# Patient Record
Sex: FEMALE | Race: WHITE | URBAN - METROPOLITAN AREA
[De-identification: names, ages, dates, MRNs, and addresses within clinical notes are randomized per-mention and may not be internally consistent; named-entity substitution may affect disease eponyms.]

---

## 2022-06-30 ENCOUNTER — HOSPITAL ENCOUNTER (INPATIENT)
Age: 37
LOS: 3 days | Discharge: HOME | DRG: 751 | End: 2022-07-03
Attending: EMERGENCY MEDICINE | Admitting: PSYCHIATRY & NEUROLOGY
Payer: COMMERCIAL

## 2022-06-30 ENCOUNTER — APPOINTMENT (OUTPATIENT)
Dept: CT IMAGING | Age: 37
DRG: 751 | End: 2022-06-30
Payer: COMMERCIAL

## 2022-06-30 DIAGNOSIS — F32.9 MAJOR DEPRESSIVE DISORDER WITH CURRENT ACTIVE EPISODE, UNSPECIFIED DEPRESSION EPISODE SEVERITY, UNSPECIFIED WHETHER RECURRENT: ICD-10-CM

## 2022-06-30 DIAGNOSIS — Z86.59 H/O: DEPRESSION: Primary | ICD-10-CM

## 2022-06-30 PROBLEM — F33.9 MAJOR DEPRESSION, RECURRENT (HCC): Status: ACTIVE | Noted: 2022-06-30

## 2022-06-30 LAB
ACETAMINOPHEN LEVEL: <5 UG/ML (ref 10–30)
ALBUMIN SERPL-MCNC: 4 G/DL (ref 3.5–4.6)
ALP BLD-CCNC: 69 U/L (ref 40–130)
ALT SERPL-CCNC: 11 U/L (ref 0–33)
AMPHETAMINE SCREEN, URINE: POSITIVE
ANION GAP SERPL CALCULATED.3IONS-SCNC: 10 MEQ/L (ref 9–15)
APTT: 29.2 SEC (ref 24.4–36.8)
AST SERPL-CCNC: 12 U/L (ref 0–35)
BARBITURATE SCREEN URINE: ABNORMAL
BASOPHILS ABSOLUTE: 0.1 K/UL (ref 0–0.2)
BASOPHILS RELATIVE PERCENT: 0.9 %
BENZODIAZEPINE SCREEN, URINE: ABNORMAL
BILIRUB SERPL-MCNC: <0.2 MG/DL (ref 0.2–0.7)
BILIRUBIN URINE: NEGATIVE
BLOOD, URINE: NEGATIVE
BUN BLDV-MCNC: 13 MG/DL (ref 6–20)
CALCIUM SERPL-MCNC: 9.3 MG/DL (ref 8.5–9.9)
CANNABINOID SCREEN URINE: POSITIVE
CHLORIDE BLD-SCNC: 101 MEQ/L (ref 95–107)
CHOLESTEROL, TOTAL: 184 MG/DL (ref 0–199)
CLARITY: ABNORMAL
CO2: 27 MEQ/L (ref 20–31)
COCAINE METABOLITE SCREEN URINE: ABNORMAL
COLOR: YELLOW
CREAT SERPL-MCNC: 0.7 MG/DL (ref 0.5–0.9)
EOSINOPHILS ABSOLUTE: 0.1 K/UL (ref 0–0.7)
EOSINOPHILS RELATIVE PERCENT: 0.9 %
ETHANOL PERCENT: NORMAL G/DL
ETHANOL: <10 MG/DL (ref 0–0.08)
FENTANYL SCREEN, URINE: ABNORMAL
GFR AFRICAN AMERICAN: >60
GFR NON-AFRICAN AMERICAN: >60
GLOBULIN: 2.6 G/DL (ref 2.3–3.5)
GLUCOSE BLD-MCNC: 95 MG/DL (ref 70–99)
GLUCOSE URINE: NEGATIVE MG/DL
HCG(URINE) PREGNANCY TEST: NEGATIVE
HCG, URINE, POC: NEGATIVE
HCT VFR BLD CALC: 28.8 % (ref 37–47)
HDLC SERPL-MCNC: 70 MG/DL (ref 40–59)
HEMOGLOBIN: 9.2 G/DL (ref 12–16)
INR BLD: 1.2
KETONES, URINE: ABNORMAL MG/DL
LDL CHOLESTEROL CALCULATED: 92 MG/DL (ref 0–129)
LEUKOCYTE ESTERASE, URINE: NEGATIVE
LYMPHOCYTES ABSOLUTE: 1.4 K/UL (ref 1–4.8)
LYMPHOCYTES RELATIVE PERCENT: 18.2 %
Lab: ABNORMAL
Lab: NORMAL
MAGNESIUM: 2 MG/DL (ref 1.7–2.4)
MCH RBC QN AUTO: 25.5 PG (ref 27–31.3)
MCHC RBC AUTO-ENTMCNC: 31.9 % (ref 33–37)
MCV RBC AUTO: 79.7 FL (ref 82–100)
METHADONE SCREEN, URINE: ABNORMAL
MONOCYTES ABSOLUTE: 0.7 K/UL (ref 0.2–0.8)
MONOCYTES RELATIVE PERCENT: 8.4 %
NEGATIVE QC PASS/FAIL: NORMAL
NEUTROPHILS ABSOLUTE: 5.7 K/UL (ref 1.4–6.5)
NEUTROPHILS RELATIVE PERCENT: 71.6 %
NITRITE, URINE: NEGATIVE
OPIATE SCREEN URINE: ABNORMAL
OXYCODONE URINE: ABNORMAL
PDW BLD-RTO: 16.5 % (ref 11.5–14.5)
PH UA: 7 (ref 5–9)
PHENCYCLIDINE SCREEN URINE: ABNORMAL
PLATELET # BLD: 422 K/UL (ref 130–400)
POSITIVE QC PASS/FAIL: NORMAL
POTASSIUM REFLEX MAGNESIUM: 3.1 MEQ/L (ref 3.4–4.9)
PROPOXYPHENE SCREEN: ABNORMAL
PROTEIN UA: NEGATIVE MG/DL
PROTHROMBIN TIME: 14.6 SEC (ref 12.3–14.9)
RBC # BLD: 3.61 M/UL (ref 4.2–5.4)
SALICYLATE, SERUM: <0.3 MG/DL (ref 15–30)
SARS-COV-2, NAAT: NOT DETECTED
SODIUM BLD-SCNC: 138 MEQ/L (ref 135–144)
SPECIFIC GRAVITY UA: 1.02 (ref 1–1.03)
TOTAL CK: 88 U/L (ref 0–170)
TOTAL PROTEIN: 6.6 G/DL (ref 6.3–8)
TRIGL SERPL-MCNC: 110 MG/DL (ref 0–150)
TSH REFLEX: 0.46 UIU/ML (ref 0.44–3.86)
URINE REFLEX TO CULTURE: ABNORMAL
UROBILINOGEN, URINE: 0.2 E.U./DL
WBC # BLD: 7.9 K/UL (ref 4.8–10.8)

## 2022-06-30 PROCEDURE — 84443 ASSAY THYROID STIM HORMONE: CPT

## 2022-06-30 PROCEDURE — 80143 DRUG ASSAY ACETAMINOPHEN: CPT

## 2022-06-30 PROCEDURE — 85610 PROTHROMBIN TIME: CPT

## 2022-06-30 PROCEDURE — 81003 URINALYSIS AUTO W/O SCOPE: CPT

## 2022-06-30 PROCEDURE — 80053 COMPREHEN METABOLIC PANEL: CPT

## 2022-06-30 PROCEDURE — 70450 CT HEAD/BRAIN W/O DYE: CPT

## 2022-06-30 PROCEDURE — 99284 EMERGENCY DEPT VISIT MOD MDM: CPT

## 2022-06-30 PROCEDURE — 83735 ASSAY OF MAGNESIUM: CPT

## 2022-06-30 PROCEDURE — 6370000000 HC RX 637 (ALT 250 FOR IP): Performed by: EMERGENCY MEDICINE

## 2022-06-30 PROCEDURE — 80179 DRUG ASSAY SALICYLATE: CPT

## 2022-06-30 PROCEDURE — 82077 ASSAY SPEC XCP UR&BREATH IA: CPT

## 2022-06-30 PROCEDURE — 80307 DRUG TEST PRSMV CHEM ANLYZR: CPT

## 2022-06-30 PROCEDURE — 80061 LIPID PANEL: CPT

## 2022-06-30 PROCEDURE — 82550 ASSAY OF CK (CPK): CPT

## 2022-06-30 PROCEDURE — 85730 THROMBOPLASTIN TIME PARTIAL: CPT

## 2022-06-30 PROCEDURE — 36415 COLL VENOUS BLD VENIPUNCTURE: CPT

## 2022-06-30 PROCEDURE — 1240000000 HC EMOTIONAL WELLNESS R&B

## 2022-06-30 PROCEDURE — 84703 CHORIONIC GONADOTROPIN ASSAY: CPT

## 2022-06-30 PROCEDURE — 93005 ELECTROCARDIOGRAM TRACING: CPT | Performed by: EMERGENCY MEDICINE

## 2022-06-30 PROCEDURE — 87635 SARS-COV-2 COVID-19 AMP PRB: CPT

## 2022-06-30 PROCEDURE — 6370000000 HC RX 637 (ALT 250 FOR IP): Performed by: NURSE PRACTITIONER

## 2022-06-30 PROCEDURE — 85025 COMPLETE CBC W/AUTO DIFF WBC: CPT

## 2022-06-30 RX ORDER — FLUOXETINE HYDROCHLORIDE 20 MG/1
20 CAPSULE ORAL DAILY
Status: ON HOLD | COMMUNITY
End: 2022-07-03 | Stop reason: HOSPADM

## 2022-06-30 RX ORDER — HALOPERIDOL 5 MG
5 TABLET ORAL EVERY 6 HOURS PRN
Status: DISCONTINUED | OUTPATIENT
Start: 2022-06-30 | End: 2022-07-03 | Stop reason: HOSPADM

## 2022-06-30 RX ORDER — POTASSIUM CHLORIDE 20 MEQ/1
40 TABLET, EXTENDED RELEASE ORAL ONCE
Status: COMPLETED | OUTPATIENT
Start: 2022-06-30 | End: 2022-06-30

## 2022-06-30 RX ORDER — ACETAMINOPHEN 325 MG/1
650 TABLET ORAL EVERY 4 HOURS PRN
Status: DISCONTINUED | OUTPATIENT
Start: 2022-06-30 | End: 2022-07-03 | Stop reason: HOSPADM

## 2022-06-30 RX ORDER — DEXTROAMPHETAMINE SACCHARATE, AMPHETAMINE ASPARTATE, DEXTROAMPHETAMINE SULFATE AND AMPHETAMINE SULFATE 7.5; 7.5; 7.5; 7.5 MG/1; MG/1; MG/1; MG/1
30 TABLET ORAL DAILY
Status: ON HOLD | COMMUNITY
End: 2022-07-03 | Stop reason: HOSPADM

## 2022-06-30 RX ORDER — HALOPERIDOL 5 MG/ML
5 INJECTION INTRAMUSCULAR EVERY 6 HOURS PRN
Status: DISCONTINUED | OUTPATIENT
Start: 2022-06-30 | End: 2022-07-03 | Stop reason: HOSPADM

## 2022-06-30 RX ORDER — BENZTROPINE MESYLATE 1 MG/ML
2 INJECTION INTRAMUSCULAR; INTRAVENOUS 2 TIMES DAILY PRN
Status: DISCONTINUED | OUTPATIENT
Start: 2022-06-30 | End: 2022-07-03 | Stop reason: HOSPADM

## 2022-06-30 RX ORDER — MAGNESIUM HYDROXIDE/ALUMINUM HYDROXICE/SIMETHICONE 120; 1200; 1200 MG/30ML; MG/30ML; MG/30ML
30 SUSPENSION ORAL PRN
Status: DISCONTINUED | OUTPATIENT
Start: 2022-06-30 | End: 2022-07-03 | Stop reason: HOSPADM

## 2022-06-30 RX ORDER — SERTRALINE HYDROCHLORIDE 100 MG/1
100 TABLET, FILM COATED ORAL DAILY
Status: ON HOLD | COMMUNITY
End: 2022-07-03 | Stop reason: HOSPADM

## 2022-06-30 RX ORDER — GABAPENTIN 300 MG/1
300 CAPSULE ORAL 2 TIMES DAILY
COMMUNITY

## 2022-06-30 RX ORDER — TRAZODONE HYDROCHLORIDE 50 MG/1
50 TABLET ORAL NIGHTLY PRN
Status: DISCONTINUED | OUTPATIENT
Start: 2022-06-30 | End: 2022-07-03 | Stop reason: HOSPADM

## 2022-06-30 RX ORDER — GABAPENTIN 300 MG/1
300 CAPSULE ORAL 2 TIMES DAILY
Status: DISCONTINUED | OUTPATIENT
Start: 2022-06-30 | End: 2022-07-02

## 2022-06-30 RX ORDER — HYDROXYZINE HYDROCHLORIDE 50 MG/ML
50 INJECTION, SOLUTION INTRAMUSCULAR EVERY 6 HOURS PRN
Status: DISCONTINUED | OUTPATIENT
Start: 2022-06-30 | End: 2022-07-03 | Stop reason: HOSPADM

## 2022-06-30 RX ORDER — HYDROXYZINE PAMOATE 50 MG/1
50 CAPSULE ORAL EVERY 6 HOURS PRN
Status: DISCONTINUED | OUTPATIENT
Start: 2022-06-30 | End: 2022-07-03 | Stop reason: HOSPADM

## 2022-06-30 RX ADMIN — GABAPENTIN 300 MG: 300 CAPSULE ORAL at 21:35

## 2022-06-30 RX ADMIN — POTASSIUM CHLORIDE 40 MEQ: 1500 TABLET, EXTENDED RELEASE ORAL at 03:51

## 2022-06-30 ASSESSMENT — SLEEP AND FATIGUE QUESTIONNAIRES
SLEEP PATTERN: NORMAL
AVERAGE NUMBER OF SLEEP HOURS: 6
DO YOU USE A SLEEP AID: NO
DO YOU HAVE DIFFICULTY SLEEPING: NO

## 2022-06-30 ASSESSMENT — PATIENT HEALTH QUESTIONNAIRE - PHQ9
SUM OF ALL RESPONSES TO PHQ QUESTIONS 1-9: 2
SUM OF ALL RESPONSES TO PHQ QUESTIONS 1-9: 12

## 2022-06-30 ASSESSMENT — PAIN - FUNCTIONAL ASSESSMENT: PAIN_FUNCTIONAL_ASSESSMENT: NONE - DENIES PAIN

## 2022-06-30 ASSESSMENT — LIFESTYLE VARIABLES
HOW MANY STANDARD DRINKS CONTAINING ALCOHOL DO YOU HAVE ON A TYPICAL DAY: 1 OR 2
HOW OFTEN DO YOU HAVE A DRINK CONTAINING ALCOHOL: MONTHLY OR LESS

## 2022-06-30 NOTE — ED TRIAGE NOTES
Per family, pt has not been taking her medications but has not been taking them at th right time. Per family she has been taking them at the same time is suppose to take them 12 hours apart. Per family they talked to her provider and she has been taking them as she is suppose to for 2 days and is still having problems. Per family pt is having sensitivity to touch, agreeing to everything, and been having memory loss.

## 2022-06-30 NOTE — ED NOTES
Call placed to [facility] and spoke with Dre Galvez to update in insurance verification and patient will not need to be assessed.       Kevin Ching RN  06/30/22 4071

## 2022-06-30 NOTE — ED NOTES
Pt is in bed area quiet and cooperative with even respirations no problems and no C/O any kind expressed.      Yuridia Soria RN  06/30/22 0782 MED
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
## 2022-06-30 NOTE — ED NOTES
Pt ate fair for breakfast drinking fluids no problems and no C/O any kind expressed.      Torsten Leung RN  06/30/22 4412
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
## 2022-06-30 NOTE — ED NOTES
Breakfast was given to the pt at the bedside, pt is quiet and cooperative with even respirations no problems and no C/O any kind expressed     Torsten Leung RN  06/30/22 2890

## 2022-06-30 NOTE — ED NOTES
Call placed to [facility] and home medications verified.       Danielle Paniagua RN  06/30/22 6682

## 2022-06-30 NOTE — PROGRESS NOTES
Patient has been very cooperative on the floor. She has been talking with  and then visiting with him in evening. She said she is already feeling better. Patient said she just misunderstood the directions for her medication. Also says her memory is coming back. Ate complete dinner.

## 2022-06-30 NOTE — ED NOTES
Spoke with  after he was able to provide the patient's HIPPA code.  states she has [employer] and it may be under his last name of Patricia Zarate. Call placed to registration and [employer] insurance was added to the chart.  states he has called her PCP and demanding the provider call the ED to discuss plan of treatment. Explained process of pink slip and denies need for patient to be assessed by a psychiatrist.  has called the ED repeatedly and provided the same information.        Sapna Choudhury RN  06/30/22 3065

## 2022-06-30 NOTE — ED NOTES
Message left for Dr. Joshua Beltre to return call for disposition.      Linda Martínez RN  06/30/22 1010

## 2022-06-30 NOTE — ED NOTES
Kaylah Fuller contacted since patient does not have any insurance on file.       Jolynn Helton RN  06/30/22 3289
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
## 2022-06-30 NOTE — ED NOTES
Pt is quiet and cooperative with no problems and no C/O any kind expressed.       Juan Carlos Villalobos, SAMSON  06/30/22 6441

## 2022-06-30 NOTE — ED NOTES
Pt is in bed area quiet and cooperative with no problems and no C/O any kind expressed.   Pt has even respirations noted     Lesley Davis RN  06/30/22 5613

## 2022-06-30 NOTE — ED NOTES
Call placed to lab and spoke with Conrad Angeles to notify of add on urine pregnancy.       Deloris Gordillo RN  06/30/22 0516

## 2022-06-30 NOTE — ED NOTES
Report given to [facility] on 3W. Patient to be admitted to room 374 after staffing is able to provide a 1:1.       Ericka Christian RN  06/30/22 7637

## 2022-06-30 NOTE — ED NOTES
Patient updated on plan of care. Patient visibly upset saying she wants to go home that she called off of work for today so she would be safe at home. When explaining that this has been going on for more than just a day and her safety is a concern as well as her judgement, she yelled that she wants to go home. She was demanding to talk to the doctor, so that she can go home. Phone call placed to doctor Thomas Gupta. Dr. Thomas Gupta on the unit to talk with her at this time.        Jolynn Helton RN  06/30/22 2027

## 2022-06-30 NOTE — ED NOTES
Received a return call from Dr. Chapis Russo. Discussed events leading to admit, current assessment, labs, CT scan and home medications. Received order to admit to 3W with 1:1 at this time for safety due to patient's behavior.       Lina Burton RN  06/30/22 6993

## 2022-06-30 NOTE — ED NOTES
Christal from [facility] called she is going to come to the [facility] to assess the pt.   Advised asleep but is arousal for an assessment  Pt has no insurance looking for a possible indigent bed     Cassidy Lakhani RN  06/30/22 3255

## 2022-06-30 NOTE — ED NOTES
Ander Ramirez notified of patient needing [facility] referral      [facility]  06/30/22 1817

## 2022-07-01 PROCEDURE — 6370000000 HC RX 637 (ALT 250 FOR IP): Performed by: NURSE PRACTITIONER

## 2022-07-01 PROCEDURE — 6370000000 HC RX 637 (ALT 250 FOR IP): Performed by: PSYCHIATRY & NEUROLOGY

## 2022-07-01 PROCEDURE — 1240000000 HC EMOTIONAL WELLNESS R&B

## 2022-07-01 PROCEDURE — 99223 1ST HOSP IP/OBS HIGH 75: CPT | Performed by: PSYCHIATRY & NEUROLOGY

## 2022-07-01 RX ORDER — DIVALPROEX SODIUM 500 MG/1
500 TABLET, EXTENDED RELEASE ORAL DAILY
Status: DISCONTINUED | OUTPATIENT
Start: 2022-07-01 | End: 2022-07-03 | Stop reason: HOSPADM

## 2022-07-01 RX ADMIN — GABAPENTIN 300 MG: 300 CAPSULE ORAL at 21:57

## 2022-07-01 RX ADMIN — DIVALPROEX SODIUM 500 MG: 500 TABLET, EXTENDED RELEASE ORAL at 10:15

## 2022-07-01 NOTE — CONSULTS
[address] MEDICINE    HISTORY AND PHYSICAL EXAM    PATIENT NAME:  Hira Mccarthy    MRN:  [id number]  SERVICE DATE:  7/1/2022   SERVICE TIME:  5:09 PM    Primary Care Physician: No primary care provider on file. SUBJECTIVE  CHIEF COMPLAINT:  Medically appropriate for inpatient psychiatry admission. Consult for medical H/P encounter. HPI:  This is a 44 y.o. female with PMHx of depression and anxiety who presented to emergency room with  for psychiatric evaluation.  is concerned for patient's safety since starting Prozac and Zoloft. He stated to emergency room staff that she is very forgetful and very easy to persuade. Patient is hemodynamically stable and afebrile. She does present with iron deficiency anemia. Potassium was 3.1, UDS positive for amphetamines and THC. Patient medically cleared from emergency room for psychiatric care. Patient Seen, Chart, Labs, Radiologystudies, and Consults reviewed. Patient denies headache, chest pain, shortness of breath, N/V/D/C, fever/chills. PAST MEDICAL HISTORY:    Past Medical History:   Diagnosis Date    Anxiety     Depression      PAST SURGICAL HISTORY:    Past Surgical History:   Procedure Laterality Date    APPENDECTOMY       FAMILY HISTORY:  No family history on file.   SOCIAL HISTORY:    Social History     Socioeconomic History    Marital status: Single     Spouse name: Not on file    Number of children: Not on file    Years of education: Not on file    Highest education level: Not on file   Occupational History    Not on file   Tobacco Use    Smoking status: Not on file    Smokeless tobacco: Not on file   Substance and Sexual Activity    Alcohol use: Not on file    Drug use: Not on file    Sexual activity: Not on file   Other Topics Concern    Not on file   Social History Narrative    Not on file     Social Determinants of Health     Financial Resource Strain:     Difficulty of Paying Living Expenses: Not on file   Food Insecurity:     Worried About [facility] in the Last Year: Not on file    Drew of Food in the Last Year: Not on file   Transportation Needs:     Lack of Transportation (Medical): Not on file    Lack of Transportation (Non-Medical):  Not on file   Physical Activity:     Days of Exercise per Week: Not on file    Minutes of Exercise per Session: Not on file   Stress:     Feeling of Stress : Not on file   Social Connections:     Frequency of Communication with Friends and Family: Not on file    Frequency of Social Gatherings with Friends and Family: Not on file    Attends Protestant Services: Not on file    Active Member of Clubs or Organizations: Not on file    Attends Club or Organization Meetings: Not on file    Marital Status: Not on file   Intimate Partner Violence:     Fear of Current or Ex-Partner: Not on file    Emotionally Abused: Not on file    Physically Abused: Not on file    Sexually Abused: Not on file   Housing Stability:     Unable to Pay for Housing in the Last Year: Not on file    Number of [place] in the Last Year: Not on file    Unstable Housing in the Last Year: Not on file     MEDICATIONS:    Current Facility-Administered Medications   Medication Dose Route Frequency Provider Last Rate Last Admin    divalproex (DEPAKOTE ER) extended release tablet 500 mg  500 mg Oral Daily Brenden Sharma MD   500 mg at 07/01/22 1015    acetaminophen (TYLENOL) tablet 650 mg  650 mg Oral Q4H PRN Brenden Sharma MD        magnesium hydroxide (MILK OF MAGNESIA) 400 MG/5ML suspension 30 mL  30 mL Oral Daily PRN Brenden Sharma MD        aluminum & magnesium hydroxide-simethicone (MAALOX) 200-200-20 MG/5ML suspension 30 mL  30 mL Oral PRN Brenden Sharma MD        haloperidol (HALDOL) tablet 5 mg  5 mg Oral Q6H PRN Brenden Sharma MD        Or    haloperidol lactate (HALDOL) injection 5 mg  5 mg IntraMUSCular Q6H PRN MD Wesly Cuellar benztropine mesylate (COGENTIN) injection 2 mg  2 mg IntraMUSCular BID PRN Brenden Sharma MD        traZODone (DESYREL) tablet 50 mg  50 mg Oral Nightly PRN Brenden Sharma MD        hydrOXYzine (VISTARIL) injection 50 mg  50 mg IntraMUSCular Q6H PRN Brenden Sharma MD        Or    hydrOXYzine pamoate (VISTARIL) capsule 50 mg  50 mg Oral Q6H PRN Brenden Sharma MD        gabapentin (NEURONTIN) capsule 300 mg  300 mg Oral BID Nicoletto Bolzan-Roche, APRN - CNP   300 mg at 06/30/22 2135       ALLERGIES: Patient has no known allergies. REVIEW OF SYSTEM:   ROS as noted in HPI, 12 point ROS reviewed and otherwise negative. OBJECTIVE  PHYSICAL EXAM: /75   Pulse 94   Temp 98.1 °F (36.7 °C)   Resp 18   Ht 5' (1.524 m)   Wt 145 lb (65.8 kg)   LMP 06/16/2022 (Approximate)   SpO2 100%   BMI 28.32 kg/m²   CONSTITUTIONAL:  awake, alert, cooperative, no apparent distress, and appears stated age  EYES:  Lids and lashes normal, pupils equal, round and reactive to light, extra ocular muscles intact, sclera clear, conjunctiva normal  ENT:  Normocephalic, without obvious abnormality, atraumatic, sinuses nontender on palpation, external ears without lesions, oral pharynx with moist mucus membranes, tonsils without erythema or exudates, gums normal and good dentition. NECK:  Supple, symmetrical, trachea midline, no adenopathy, thyroid symmetric, not enlarged and no tenderness, skin normal  LUNGS:  No increased work of breathing, good air exchange, clear to auscultation bilaterally, no crackles or wheezing  CARDIOVASCULAR:  Normal apical impulse, regular rate and rhythm, normal S1 and S2, no S3 or S4, and no murmur noted  ABDOMEN:  No scars, normal bowel sounds, soft, non-distended, non-tender, no masses palpated, no hepatosplenomegally  MUSCULOSKELETAL:  There is no redness, warmth, or swelling of the joints. Full range of motion noted. Motor strength is 5 out of 5 all extremities bilaterally.   Tone is normal.  NEUROLOGIC:  Awake, alert, oriented to name, place and time. Cranial nerves II-XII are grossly intact. Motor is 5 out of 5 bilaterally. Sensory is intact.  gait is normal.  SKIN:  no bruising or bleeding, normal skin color, texture, turgor, no redness, warmth, or swelling and no jaundice    DATA:     Diagnostic tests reviewed for today's visit:    Most recent labs and imaging results reviewed. VTE Prophylaxis:     ASSESSMENT AND PLAN  Principal Problem:    Major depression, recurrent ([facility] [address]  Plan: Patient admitted to behavorial health for evaluation and treatment     Mild hypokalemia: Does not appear the patient was given any potassium replacement. Will recheck BMP in a.m. Microcytic anemia, likely due to iron deficiency or anemia of chronic disease: Checking Iron panel and TIBC    This is only a history and physical examination and not medical management. The patient is to contact and follow up with their primary care physician and go over any abnormal labs, imaging, findings, medical concerns, or conditions that we have and have not addressed during this encounter.     Plan of care discussed with: patient    SIGNATURE: CHAPARRO Mercer CNP  DATE: July 1, 2022  TIME: 5:09 PM

## 2022-07-01 NOTE — PROGRESS NOTES
Pt reports takes neurontin at home for RLS. Per home med list neurontin 300mg tid. PS Leydi Shearer NP, verified with oarrs report scheduled twice daily. NP did reorder neurontin 300mg bid.

## 2022-07-01 NOTE — H&P
[address] [place] [address] Department of Psychiatry    History and Physical - Adult         CHIEF COMPLAINT:  Hypomania    History obtained from:  patient    Patient was seen after discussing with the treatment team and reviewing the chart        CIRCUMSTANCES OF ADMISSION:     Patient was brought in by her  and daughter after he noticed a change in her. Per : the wife is doing things that she has never done before. For example, on the way into the hospital he asked her to give him \"a blow job\", and she was going to do it. He did not allow it to happen and talked to her about the fact that she was willing to do this while their 16year old daughter was in the back seat. He says this behavior is not characteristic of her. She has had recent medication changes within the last 2 weeks. She also has stress at her job where she is training for a promotion which she does admit that is increasing her stress. While talking she mentioned that her sleeping has been off because she doesn't want to sleep, siting that she wants to be able to see her  instead of sleeping. Later in the conversation when asked about how many hours she was sleeping a night, she said that she gets in between 6-10 hours each night. At times she seems confused while talking and her eyes will dart back in forth like she is searching for an answer. She denies si, hi and avh. Maintains appropriate eye contact. Hygiene is adequate, but there is concern for her safety as she admits that she just does whatever she is told to do without regard to consequences, morals, values or ethics. HISTORY OF PRESENT ILLNESS:      The patient is a 44 y.o. female live with her  and daughter 17 y/o with significant past history of MDD and ADD     brought her to hospital because he was concerned about the patient  Stressors:Recent medication changes: added Prozac and an increase to her Adderral (two times).  Stressed at work, with full time  Relationships: ,  for 2 months and reconciled again a month ago  Children: 1  Current Support: romantic partner    Legal Hx: none  Access to weapons?:  No      EXAMINATION:    REVIEW OF SYSTEMS:    ROS:  [x] All negative/unchanged except if checked.  Explain positive(checked items) below:  [] Constitutional  [] Eyes  [] Ear/Nose/Mouth/Throat  [] Respiratory  [] CV  [] GI  []   [] Musculoskeletal  [] Skin/Breast  [] Neurological  [] Endocrine  [] Heme/Lymph  [] Allergic/Immunologic    Explanation:     Vitals:  /75   Pulse 94   Temp 98.1 °F (36.7 °C)   Resp 18   Ht 5' (1.524 m)   Wt 145 lb (65.8 kg)   LMP 06/16/2022 (Approximate)   SpO2 100%   BMI 28.32 kg/m²      Neurologic Exam:   Muscle Strength & Tone: full ROM  Gait: normal gait   Involuntary Movements: No    Mental Status Examination:    Level of consciousness:  within normal limits   Appearance:  street clothes  Behavior/Motor:  no abnormalities noted  Attitude toward examiner:  cooperative  Speech:  rapid   Mood: labile  Affect:  mood incongruent  Thought processes:  rapid   Association:  Thought content:  Suicidal Ideation:  denies suicidal ideation  Delusions:  no evidence of delusions  Perceptual Disturbance:  denies any perceptual disturbance  Cognition:  oriented to person, place, and time   Attention & Concentration poor  Memory intact  Insight poor   Judgement poor  Fund of Knowledge limited    Mini Mental Status 30/30      DIAGNOSIS:     Bipolar hypomanic episode           RISK ASSESSMENT:    SUICIDE RISK ASSESSMENT: high risk of impulsivity  HOMICIDE: low  AGITATION/VIOLENCE: low  ELOPEMENT: low    LABS: REVIEWED TODAY:  Recent Labs     06/30/22  0200   WBC 7.9   HGB 9.2*   *     Recent Labs     06/30/22  0200      K 3.1*      CO2 27   BUN 13   CREATININE 0.70   GLUCOSE 95     Recent Labs     06/30/22  0200   BILITOT <0.2   ALKPHOS 69   AST 12   ALT 11     Lab Results   Component Value Date/Time    LABAMPH POSITIVE 06/30/2022 02:00 AM    BARBSCNU Neg 06/30/2022 02:00 AM    LABBENZ Neg 06/30/2022 02:00 AM    LABMETH Neg 06/30/2022 02:00 AM    OPIATESCREENURINE Neg 06/30/2022 02:00 AM    PHENCYCLIDINESCREENURINE Neg 06/30/2022 02:00 AM    ETOH <10 06/30/2022 02:00 AM     No results found for: TSH, FREET4  No results found for: LITHIUM  No results found for: VALPROATE, CBMZ  No results found for: LITHIUM, VALPROATE    FURTHER LABS ORDERED :      Radiology   CT HEAD WO CONTRAST    Result Date: 6/30/2022  CT HEAD WO CONTRAST CLINICAL HISTORY:   AMS, facial droop COMPARISON:  NONE AVAILABLE TECHNIQUE: Multiple unenhanced serial axial images of the brain from the vertex of the skull to the base of the skull were performed. FINDINGS:  The ventricles are of normal size and configuration. No mass or midline shift. The cisterns are unremarkable. No acute intra-axial or extra-axial findings. The visualized osseous structures are unremarkable. The visualized paranasal sinuses and mastoids are unremarkable. IMPRESSION NO ACUTE INTRA-AXIAL OR EXTRA AXIAL FINDINGS. All CT scans at this facility use dose modulation, iterative reconstruction, and/or weight based dosing when appropriate to reduce radiation dose to as low as reasonably achievable. EKG: TRACING REVIEWED    TREATMENT PLAN:    Risk Management:  close watch and suicide risk    This patient was assessed for Medical bed necessity for the following reason:  N/A    Collateral Information:  Will obtain collateral information from the family or friends. Will obtain medical records as appropriate from out patient providers  Will consult the hospitalist for a physical exam to rule out any co-morbid physical condition. Home medication Reconciled       New Medications started during this admission :    See orders  Prn Haldol 5mg and Vistaril 50mg q6hr for extreme agitation.   Trazodone as ordered for insomnia  Vistaril as ordered for anxiety  Discussed with the patient risk, benefit, alternative and common side effects for the  proposed medication treatment. Patient is consenting to the treatment.     Psychotherapy:   Encourage participation in milieu and group therapy  Individual therapy as needed      Electronically signed by Mil Quintero MD on 7/1/2022 at 9:45 AM

## 2022-07-01 NOTE — CARE COORDINATION
Brief Intervention and Referral to Treatment Summary    Patient was provided PHQ-9, AUDIT-C and DAST Screening:      PHQ-9 Score: 2  AUDIT-C Score:  1  DAST Score:   0    USD positive for THC and amphetamines  Patient does take adderral & prozac     Patients substance use is considered     Low Risk/Healthy x  Moderate Risk  Harmful  Dependent    Patients depression is considered:     Minimal x  Mild   Moderate  Moderately Severe  Severe    Brief Education Was Provided N/A    Patient was receptive  Patient was not receptive      Brief Intervention Is Provided (Only for AUDIT-C or DAST) N/A    Patient reports readiness to decrease and/or stop use and a plan was discussed   Patient denies readiness to decrease and/or stop use and a plan was not discussed      Recommendations/Referrals for Brief and/or Specialized Treatment Provided to Patient   Patient denied drug and alcohol use. As a result, no recommendations or referrals were provided to the patient at this time. USD positive for THC and amphetamines  Patient does take adderral & prozac. Patient sees Guille Barnett for medications.

## 2022-07-01 NOTE — PROGRESS NOTES
Pt. refused to attend the 1000 skills group, despite staff encouragement.  Electronically signed by Sue Ibarra on 7/1/2022 at 11:16 AM

## 2022-07-01 NOTE — PROGRESS NOTES
Morning Community Meeting Topics    Frederick Ervin attended the morning community meeting on 7/1/22. Topics discussed today     [x] Introduction   Day of the week and date   Mask distribution   Current mask requirements  [x]Teams   Explanation of  Green and Blue team criteria   Nurses assigned to each team for today   Explanation about green and blue paper  o Date  o Patient's Name  o Patient's Nurse  o Goals  [x] Visitation   Announce the visiting hours for the day   Announce which team is allowed to have visitors for the day   Review any updated Covid 19 requirements for visitors during visitation  o Vaccine Card or negative Covid test within 48 hours of visit  o State Identification   Patients are reminded to alert the  at least 1 hour before visitation   [x] Unit Orientation   Coffee use   Phone location and etiquette   Shower locations  [employer] and dryer location and process   Common area expectations   Staff rounds expectation  [x] Meals    Educate patient to the menu  o The patient is encouraged to fill out the menu to get preferences at mealtime  o The patient is educated that if they do not fill out the menu, they will get the standard tray  o The coffee pot is decaf, patient encouraged to order regular coffee from menu.    Educate patient to the meal process   Patient encouraged to eat snacks provided twice daily  o Snacks may stay in patient room     [x] Discharge Process   Discharge expectations   Fill out the survey after discharge   [x] Hygiene   Daily showers encouraged  o Showers availability discussed    Daily dressing encouraged  o Discussed wearing street clothing   Education provided on where to place linens and clothing  o Linens in the hamper  o personal clothing does not go into the linen hamper  [x] Group    Patient encouraged to attend group provided   Time of Group Meetings discussed   Gentle reminder that attendance is a Physician order  [x] Movement   Chair exercises completed   Stretching completed  Notes: GOAL : \" to hope the meds start working\" Electronically signed by Marina Latham on 7/1/2022 at 12:00 PM

## 2022-07-01 NOTE — PROGRESS NOTES
Patient refused her neurontin this morning  Patient said she only takes 300 mg once a day at night for her restless legs.

## 2022-07-01 NOTE — GROUP NOTE
Group Therapy Note    Date: 7/1/2022    Group Start Time: 5950  Group End Time: 1700  Group Topic: Healthy Living/Wellness    [facility]    Mamie Rolle        Group Therapy Note    Attendees: 10/12         Patient's Goal:  To learn about healthy coping skills. Notes:  Patient participated in group.      Status After Intervention:  Unchanged    Participation Level: Interactive    Participation Quality: Appropriate and Attentive      Speech:  normal      Thought Process/Content: Logical      Affective Functioning: Flat      Mood: euthymic      Level of consciousness:  Alert and Attentive      Response to Learning: Progressing to goal      Endings: None Reported    Modes of Intervention: Education      Discipline Responsible: [address], [employer] [address] [employer]      Signature:  Mamie Rolle

## 2022-07-01 NOTE — PROGRESS NOTES
Patient did not attend group despite staff encouragement.   Electronically signed by Harry Tapia on 6/30/2022 at 9:47 PM

## 2022-07-01 NOTE — CARE COORDINATION
Psychosocial Assessment    Current Level of Psychosocial Functioning     Independent   Dependent    Minimal Assist x    Comments:  Depression R/O Bipolar   for 17 years  Recent medication changes: added Prozac and an increase to her Adderral   Employed     Psychosocial High Risk Factors (check all that apply)    Unable to obtain meds   Chronic illness/pain    Substance abuse   Lack of Family Support   Financial stress   Isolation   Inadequate Community Resources X  Suicide attempt(s)  Not taking medications   Victim of crime   Developmental Delay  Unable to manage personal needs    Age 72 or older   Homeless  No transportation   Readmission within 30 days  Unemployment  Traumatic Event    Family/Supports identified:    is supportive  Sexual Orientation:    Patient is in a heterosexual marriage  Patient Strengths:  Positive support, employed  Patient Barriers:   Not connected to [address] agency  Safety plan:  completed  CMHC/MH history:  No prior MH admissions  Plan of Care:  medication management, group/individual therapies, family meetings, psycho -education, treatment team meetings to assist with stabilization    Initial Discharge Plan:    Return to her home  Clinical Summary:    Per notes, Patient was brought in by her  and daughter after he noticed a change in her. Per : the wife is doing things that she has never done before. She admits that she just does whatever she is told to do without regard to consequences, morals, values or ethics  She has had recent medication changes within the last 2 weeks. She also has stress at her job where she is training for a promotion which she does admit that is increasing her stress. Patient was cooperative and answered questions appropriately. She maintained good eye contact throughout the interview.   She states she has never been in the hospital for [address] concerns before and she doesn't understand why she needs to here now. She is discharged focused. She denied drug and alcohol use. She is not connected to a [place] [address] provider and her PCP prescribes her medications. She did sign referral for [facility], but doesn't feel she needs a therapist. She plans to return to her home after discharge.  will assist with discharge per doctor's orders.

## 2022-07-01 NOTE — PROGRESS NOTES
Behavioral Services  Medicare Certification Upon Admission    I certify that this patient's inpatient psychiatric hospital admission is medically necessary for:    [x] (1) Treatment which could reasonably be expected to improve this patient's condition,       [x] (2) Or for diagnostic study;     AND     [x](2) The inpatient psychiatric services are provided while the individual is under the care of a physician and are included in the individualized plan of care.     Estimated length of stay/service 3-5 days    Plan for post-hospital care OP care    Electronically signed by Jason Nye MD on 7/1/2022 at 9:44 AM

## 2022-07-01 NOTE — PROGRESS NOTES
Pt. presents pleasant. Resting in bed. Reports taking meds the wrong way and \"felt like she had no free will. I was very suggestable and it threw me into a manic state. \"  Vague wioth any other details of admission. Reports working a lot of hours and that she is up for a big promotion at [employer]. Poor sleep but ok appetite. Has supportive friends and family. Denies AH/VH and no si/hi. Is  but was  several months.  now back in the house. Denies drinking ETOH and does not smoke marijuana or use any other drugs. Wants to feel better and not so tired.  Stressors include  1.work   *doing stuff with daughter when not working, used to malcolm  Electronically signed by Radha Albarran on 7/1/2022 at 12:14 PM

## 2022-07-02 LAB
ANION GAP SERPL CALCULATED.3IONS-SCNC: 11 MEQ/L (ref 9–15)
BUN BLDV-MCNC: 11 MG/DL (ref 6–20)
CALCIUM SERPL-MCNC: 9.1 MG/DL (ref 8.5–9.9)
CHLORIDE BLD-SCNC: 104 MEQ/L (ref 95–107)
CO2: 25 MEQ/L (ref 20–31)
CREAT SERPL-MCNC: 0.68 MG/DL (ref 0.5–0.9)
GFR AFRICAN AMERICAN: >60
GFR NON-AFRICAN AMERICAN: >60
GLUCOSE BLD-MCNC: 93 MG/DL (ref 70–99)
IRON SATURATION: 6 % (ref 20–55)
IRON: 23 UG/DL (ref 37–145)
POTASSIUM REFLEX MAGNESIUM: 4.3 MEQ/L (ref 3.4–4.9)
SODIUM BLD-SCNC: 140 MEQ/L (ref 135–144)
TOTAL IRON BINDING CAPACITY: 357 UG/DL (ref 250–450)
UNSATURATED IRON BINDING CAPACITY: 334 UG/DL (ref 112–347)

## 2022-07-02 PROCEDURE — 6370000000 HC RX 637 (ALT 250 FOR IP): Performed by: PSYCHIATRY & NEUROLOGY

## 2022-07-02 PROCEDURE — 83540 ASSAY OF IRON: CPT

## 2022-07-02 PROCEDURE — 90833 PSYTX W PT W E/M 30 MIN: CPT | Performed by: PSYCHIATRY & NEUROLOGY

## 2022-07-02 PROCEDURE — 80048 BASIC METABOLIC PNL TOTAL CA: CPT

## 2022-07-02 PROCEDURE — 36415 COLL VENOUS BLD VENIPUNCTURE: CPT

## 2022-07-02 PROCEDURE — 1240000000 HC EMOTIONAL WELLNESS R&B

## 2022-07-02 PROCEDURE — 83550 IRON BINDING TEST: CPT

## 2022-07-02 PROCEDURE — 99232 SBSQ HOSP IP/OBS MODERATE 35: CPT | Performed by: PSYCHIATRY & NEUROLOGY

## 2022-07-02 RX ORDER — GABAPENTIN 300 MG/1
300 CAPSULE ORAL NIGHTLY
Status: DISCONTINUED | OUTPATIENT
Start: 2022-07-02 | End: 2022-07-03 | Stop reason: HOSPADM

## 2022-07-02 RX ADMIN — GABAPENTIN 300 MG: 300 CAPSULE ORAL at 21:13

## 2022-07-02 RX ADMIN — DIVALPROEX SODIUM 500 MG: 500 TABLET, EXTENDED RELEASE ORAL at 08:23

## 2022-07-02 NOTE — GROUP NOTE
Group Therapy Note    Date: 7/1/2022    Group Start Time: 1900  Group End Time: 1930  Group Topic: Recreational    [facility]    Vanessa Ochoa RN        Group Therapy Note    Attendees: 5         Patient's Goal:  Attend group    Notes:  Good participation    Status After Intervention:  Unchanged    Participation Level:  Active Listener    Participation Quality: Appropriate      Speech:  normal      Thought Process/Content: Logical      Affective Functioning: Congruent      Mood: euthymic      Level of consciousness:  Alert      Response to Learning: Progressing to goal      Endings: None Reported    Modes of Intervention: Socialization      Discipline Responsible: Registered Nurse      Signature:  Vanessa Ochoa RN

## 2022-07-02 NOTE — PROGRESS NOTES
Pt. declined to attend the 0900 community meeting, despite staff encouragement.  GOAL : \" to go home\" Electronically signed by Krista Shelton on 7/2/2022 at 9:58 AM

## 2022-07-02 NOTE — PROGRESS NOTES
Patient isolates to her room. Affect is depressed but she reports  Depression is improving. Old eye make-up smeared on face. Denies being suicidal or homicidal. Speaks of a H/O miscarriage as well as loss of an infant to SIDS. Shares her diagnosis changed from Depression to Bipolar. Reports feeling improved on current medication.

## 2022-07-02 NOTE — CARE COORDINATION
7/2/22 @ 2:50 pm    This writer was speaking with patient's  to obtain collateral note. After several moments, he abruptly ended the phone call, requesting this writer call him back on Monday (after noon) to complete the interview.

## 2022-07-02 NOTE — PROGRESS NOTES
Evening assessment completed. Pt assessed at the bedside. Pt calm, fair eye contact and cooperative. Pt stated her day was \"different\" d/t medication change. Pt stated she feels like she slept most of the day. Pt out for meals. showered, attended some groups, endorsed good sleep and appetite. Pt \"sad\" she wasn't d/c today. Pt stated she will be d/c [place] per .  PT stated she is \"excited about going home\" and reports do worries about leaving. PT lives at home with daughter and . Pt stated she will be med compliant. LBM today. Pt denies SI/HI/AVH. Pt rates anxiety and depression 4 d/t not being d/c today.

## 2022-07-02 NOTE — GROUP NOTE
Group Therapy Note    Date: 7/2/2022    Group Start Time: 1600  Group End Time: 9239  Group Topic: Healthy Living/Wellness    [facility]    Erna Arnold RN        Group Therapy Note    Attendees: 8/12         Patient's Goal:    Learning the importance of taking  medication as ordered during hospital stay and after. Status After Intervention:  Improved    Participation Level:  Active Listener    Participation Quality: Appropriate      Speech:  normal      Thought Process/Content: Logical      Affective Functioning: Congruent      Mood: euthymic      Level of consciousness:  Oriented x4      Response to Learning: Able to verbalize current knowledge/experience, Capable of insight and Progressing to goal      Endings: None Reported    Modes of Intervention: Education, Support, Socialization and Problem-solving      Discipline Responsible: Registered Nurse      Signature:  Erna Arnold RN

## 2022-07-02 NOTE — PROGRESS NOTES
[facility] FOLLOW-UP NOTE       7/2/2022     Patient was seen and examined in person, Chart reviewed   Patient's case discussed with staff/team    Chief Complaint:Janice    Interim History:     Pt report feeling much better since the change in medication  No racing thoughts  Able to concentrate better  No SI or HI  Has been talking with her    want her to come home  Pt like her job although it was stressful  Denies any AH or VH  No paranoid thoughts  Appetite:   [x] Normal/Unchanged  [] Increased  [] Decreased      Sleep:       [] Normal/Unchanged  [x] Fair       [] Poor              Energy:    [x] Normal/Unchanged  [] Increased  [] Decreased        SI [] Present  [x] Absent    HI  []Present  [x] Absent     Aggression:  [] yes  [x] no    Patient is [x] able  [] unable to CONTRACT FOR SAFETY     PAST MEDICAL/PSYCHIATRIC HISTORY:   Past Medical History:   Diagnosis Date    Anxiety     Depression        FAMILY/SOCIAL HISTORY:  No family history on file. Social History     Socioeconomic History    Marital status: Single     Spouse name: Not on file    Number of children: Not on file    Years of education: Not on file    Highest education level: Not on file   Occupational History    Not on file   Tobacco Use    Smoking status: Not on file    Smokeless tobacco: Not on file   Substance and Sexual Activity    Alcohol use: Not on file    Drug use: Not on file    Sexual activity: Not on file   Other Topics Concern    Not on file   Social History Narrative    Not on file     Social Determinants of Health     Financial Resource Strain:     Difficulty of Paying Living Expenses: Not on file   Food Insecurity:     Worried About Running Out of Food in the Last Year: Not on file    Drew of Food in the Last Year: Not on file   Transportation Needs:     Lack of Transportation (Medical): Not on file    Lack of Transportation (Non-Medical):  Not on file Physical Activity:     Days of Exercise per Week: Not on file    Minutes of Exercise per Session: Not on file   Stress:     Feeling of Stress : Not on file   Social Connections:     Frequency of Communication with Friends and Family: Not on file    Frequency of Social Gatherings with Friends and Family: Not on file    Attends Religion Services: Not on file    Active Member of [address] [place] [address] or Organizations: Not on file    Attends Club or Organization Meetings: Not on file    Marital Status: Not on file   Intimate Partner Violence:     Fear of Current or Ex-Partner: Not on file    Emotionally Abused: Not on file    Physically Abused: Not on file    Sexually Abused: Not on file   Housing Stability:     Unable to Pay for Housing in the Last Year: Not on file    Number of [place] in the Last Year: Not on file    Unstable Housing in the Last Year: Not on file           ROS:  [x] All negative/unchanged except if checked.  Explain positive(checked items) below:  [] Constitutional  [] Eyes  [] Ear/Nose/Mouth/Throat  [] Respiratory  [] CV  [] GI  []   [] Musculoskeletal  [] Skin/Breast  [] Neurological  [] Endocrine  [] Heme/Lymph  [] Allergic/Immunologic    Explanation:     MEDICATIONS:    Current Facility-Administered Medications:     gabapentin (NEURONTIN) capsule 300 mg, 300 mg, Oral, Nightly, Emily Burgos MD    divalproex (DEPAKOTE ER) extended release tablet 500 mg, 500 mg, Oral, Daily, Emily Burgos MD, 500 mg at 07/02/22 7033    acetaminophen (TYLENOL) tablet 650 mg, 650 mg, Oral, Q4H PRN, Emily Burgos MD    magnesium hydroxide (MILK OF MAGNESIA) 400 MG/5ML suspension 30 mL, 30 mL, Oral, Daily PRN, Emily Burgos MD    aluminum & magnesium hydroxide-simethicone (MAALOX) 200-200-20 MG/5ML suspension 30 mL, 30 mL, Oral, PRN, Emily Burgos MD    haloperidol (HALDOL) tablet 5 mg, 5 mg, Oral, Q6H PRN **OR** haloperidol lactate (HALDOL) injection 5 mg, 5 mg, IntraMUSCular, Q6H PRN, Pablo Akers MD    benztropine mesylate (COGENTIN) injection 2 mg, 2 mg, IntraMUSCular, BID PRN, Pablo Akers MD    traZODone (DESYREL) tablet 50 mg, 50 mg, Oral, Nightly PRN, Pablo Akers MD    hydrOXYzine (VISTARIL) injection 50 mg, 50 mg, IntraMUSCular, Q6H PRN **OR** hydrOXYzine pamoate (VISTARIL) capsule 50 mg, 50 mg, Oral, Q6H PRN, Pablo Akers MD      Examination:  /65   Pulse 70   Temp 98.2 °F (36.8 °C)   Resp 18   Ht 5' (1.524 m)   Wt 145 lb (65.8 kg)   LMP 06/16/2022 (Approximate)   SpO2 97%   BMI 28.32 kg/m²   Gait - steady  Medication side effects(SE): no    Mental Status Examination:    Level of consciousness:  within normal limits   Appearance:  fair grooming and fair hygiene  Behavior/Motor:  no abnormalities noted  Attitude toward examiner:  cooperative  Speech:  slow   Mood: less elated, euthymic  Affect:  mood congruent  Thought processes:  coherent   Thought content:  Suicidal Ideation:  denies suicidal ideation  Cognition:  oriented to person, place, and time   Concentration distractible  Insight fair   Judgement fair     ASSESSMENT:   Patient symptoms are:  [] Well controlled  [] Improving  [] Worsening  [] No change      Diagnosis:   Bipolar hypomanic    LABS:    Recent Labs     06/30/22  0200   WBC 7.9   HGB 9.2*   *     Recent Labs     06/30/22  0200 07/02/22  0623    140   K 3.1* 4.3    104   CO2 27 25   BUN 13 11   CREATININE 0.70 0.68   GLUCOSE 95 93     Recent Labs     06/30/22  0200   BILITOT <0.2   ALKPHOS 69   AST 12   ALT 11     Lab Results   Component Value Date/Time    LABAMPH POSITIVE 06/30/2022 02:00 AM    BARBSCNU Neg 06/30/2022 02:00 AM    LABBENZ Neg 06/30/2022 02:00 AM    LABMETH Neg 06/30/2022 02:00 AM    OPIATESCREENURINE Neg 06/30/2022 02:00 AM    PHENCYCLIDINESCREENURINE Neg 06/30/2022 02:00 AM    ETOH <10 06/30/2022 02:00 AM     No results found for: TSH, FREET4  No results found for: LITHIUM  No results found for: VALPROATE, CBMZ      Treatment Plan:  Reviewed current Medications with the patient. Meds as ordered  Risks, benefits, side effects, drug-to-drug interactions and alternatives to treatment were discussed. Collateral information:   CD evaluation  Encourage patient to attend group and other milieu activities.   Discharge planning discussed with the patient and treatment team.    PSYCHOTHERAPY/COUNSELING:  [x] Therapeutic interview  [x] Supportive  [] CBT  [] Ongoing  [] Other  Patient was seen 1:1 for 20 minutes, other than E&M time spent, focusing on      - coping skills techniques     - Anxiety management techniques discussed including deep breathing exercise and PMR     - discussing patients strength and weakness      - Focusing on negative cognition and maladaptive thoughts, which is feeding and maintaining the depression symptoms       [x] Patient continues to need, on a daily basis, active treatment furnished directly by or requiring the supervision of inpatient psychiatric personnel      Anticipated Length of stay:            Electronically signed by Reyna Whitley MD on 7/2/2022 at 9:47 AM

## 2022-07-02 NOTE — CARE COORDINATION
FAMILY COLLATERAL NOTE    Family/Support Name: Jose Alberto Lema #: [phone]  Relationship to Pt[de-identified]    23 years       Family/Support contact aware of hospitalization: Yes    Presenting Symptoms/Current Concerns:  Red flag was lack of free will  Obeying verbal commands   She was doing exactly as she was told  I started questioning and probing and \"it got way out of my pay grade\"  She has never had any SI  No extreme behavior   Gave  severe anxiety when he was around her. She was just diagnosed with Borderline bipolar     Top 3 Life Stressors:   Separation was a stressor, not sure if they will reconcile. Job/ training for promotion       Background History Relevant to Current Hospitalization:  No prior hospitalization       Family Mental Health/Substance Use History:   Her family is a bunch of retards\"  No history of mental health       Support Network's Goal for Hospitalization:     Discharge Plan:       Support Network Supportive of Discharge Plan:       Support can confirm Safety of Location and Security of Weapons:       Support agreeable to Safeguard and Monitor Medications (including Prescription and OTC):      Identified Barriers to Compliance with Discharge Plan:     Recommendations for Support Network:         Alva Tripathi MSW, LSW

## 2022-07-02 NOTE — PROGRESS NOTES
Pt. refused to attend the 1000 skills group, despite staff encouragement.  Electronically signed by Gustavo Burgos on 7/2/2022 at 12:04 PM

## 2022-07-02 NOTE — GROUP NOTE
Group Therapy Note    Date: 7/1/2022    Group Start Time: 1930  Group End Time: 1945  Group Topic: Wrap-Up    [facility]    Flash Kimball RN        Group Therapy Note    Attendees: 5         Patient's Goal:  For medications to work    Notes:  Progressing towards goal    Status After Intervention:  Unchanged    Participation Level:  Active Listener    Participation Quality: Appropriate      Speech:  normal      Thought Process/Content: Logical      Affective Functioning: Congruent      Mood: euthymic      Level of consciousness:  Alert      Response to Learning: Progressing to goal      Endings: None Reported    Modes of Intervention: Socialization      Discipline Responsible: Registered Nurse      Signature:  Flash Kimball RN

## 2022-07-03 VITALS
TEMPERATURE: 98.6 F | BODY MASS INDEX: 28.47 KG/M2 | DIASTOLIC BLOOD PRESSURE: 70 MMHG | HEIGHT: 60 IN | WEIGHT: 145 LBS | OXYGEN SATURATION: 99 % | SYSTOLIC BLOOD PRESSURE: 108 MMHG | RESPIRATION RATE: 19 BRPM | HEART RATE: 90 BPM

## 2022-07-03 LAB
EKG ATRIAL RATE: 73 BPM
EKG P AXIS: 50 DEGREES
EKG P-R INTERVAL: 148 MS
EKG Q-T INTERVAL: 384 MS
EKG QRS DURATION: 86 MS
EKG QTC CALCULATION (BAZETT): 423 MS
EKG R AXIS: 31 DEGREES
EKG T AXIS: 34 DEGREES
EKG VENTRICULAR RATE: 73 BPM

## 2022-07-03 PROCEDURE — 93010 ELECTROCARDIOGRAM REPORT: CPT | Performed by: INTERNAL MEDICINE

## 2022-07-03 PROCEDURE — 6370000000 HC RX 637 (ALT 250 FOR IP): Performed by: PSYCHIATRY & NEUROLOGY

## 2022-07-03 PROCEDURE — 99239 HOSP IP/OBS DSCHRG MGMT >30: CPT | Performed by: PSYCHIATRY & NEUROLOGY

## 2022-07-03 RX ORDER — DIVALPROEX SODIUM 500 MG/1
500 TABLET, EXTENDED RELEASE ORAL DAILY
Qty: 15 TABLET | Refills: 3 | Status: SHIPPED | OUTPATIENT
Start: 2022-07-04

## 2022-07-03 RX ADMIN — DIVALPROEX SODIUM 500 MG: 500 TABLET, EXTENDED RELEASE ORAL at 08:12

## 2022-07-03 NOTE — PROGRESS NOTES
Pt. refused to attend the 1000 skills group, despite staff encouragement.  Electronically signed by Marina Latham on 7/3/2022 at 11:45 AM

## 2022-07-03 NOTE — DISCHARGE INSTR - DIET

## 2022-07-03 NOTE — PROGRESS NOTES
Morning Community Meeting Topics    Glenna Reid attended the morning community meeting on 7/3/22. Topics discussed today     [x] Introduction   Day of the week and date   Mask distribution   Current mask requirements  [x]Teams   Explanation of  Green and Blue team criteria   Nurses assigned to each team for today   Explanation about green and blue paper  o Date  o Patient's Name  o Patient's Nurse  o Goals  [x] Visitation   Announce the visiting hours for the day   Announce which team is allowed to have visitors for the day   Review any updated Covid 19 requirements for visitors during visitation  o Vaccine Card or negative Covid test within 48 hours of visit  o State Identification   Patients are reminded to alert the  at least 1 hour before visitation   [x] Unit Orientation   Coffee use   Phone location and etiquette   Shower locations  [employer] and dryer location and process   Common area expectations   Staff rounds expectation  [x] Meals    Educate patient to the menu  o The patient is encouraged to fill out the menu to get preferences at mealtime  o The patient is educated that if they do not fill out the menu, they will get the standard tray  o The coffee pot is decaf, patient encouraged to order regular coffee from menu.    Educate patient to the meal process   Patient encouraged to eat snacks provided twice daily  o Snacks may stay in patient room     [x] Discharge Process   Discharge expectations   Fill out the survey after discharge   [x] Hygiene   Daily showers encouraged  o Showers availability discussed    Daily dressing encouraged  o Discussed wearing street clothing   Education provided on where to place linens and clothing  o Linens in the hamper  o personal clothing does not go into the linen hamper  [x] Group    Patient encouraged to attend group provided   Time of Group Meetings discussed   Gentle reminder that attendance is a Physician order  [x] Movement   Chair exercises completed   Stretching completed  Notes: GOAL : \" to go home\" Electronically signed by Rafita Oliver on 7/3/2022 at 11:44 AM

## 2022-07-03 NOTE — DISCHARGE SUMMARY
DISCHARGE SUMMARY      Patient ID:  Charlie Everett  [id number]  66 y.o.  1985      Admit date: 6/30/2022    Discharge date and time: 7/3/2022    Admitting Physician: Brenden Sharma MD     Discharge Physician: Dr German Suh MD    Admission Diagnoses: H/O: depression [Z86.59]  Major depressive disorder with current active episode, unspecified depression episode severity, unspecified whether recurrent [F32.9]  Major depression, recurrent ([address] [F33.9]    Admission Condition: poor    Discharged Condition: stable    Admission Circumstance:       Patient was brought in by her  and daughter after he noticed a change in her.  Per : the wife is doing things that she has never done before.  For example, on the way into the hospital he asked her to give him \"a blow job\", and she was going to do it. [facility] did not allow it to happen and talked to her about the fact that she was willing to do this while their 16year old daughter was in the back Corey Lebron says this behavior is not characteristic of her. Alice Waters has had recent medication changes within the last 2 weeks.  She also has stress at her job where she is training for a promotion which she does admit that is increasing her stress.  While talking she mentioned that her sleeping has been off because she doesn't want to sleep, siting that she wants to be able to see her  instead of sleeping. Alyshala Blind in the conversation when asked about how many hours she was sleeping a night, she said that she gets in between 6-10 hours each night.  At times she seems confused while talking and her eyes will dart back in forth like she is searching for an answer.  She denies si, hi and avh. Maintains appropriate eye contact.  Hygiene is adequate, but there is concern for her safety as she admits that she just does whatever she is told to do without regard to consequences, morals, values or ethics.      HISTORY OF PRESENT ILLNESS:       The patient is a 44 y.o. female Medical History:   Diagnosis Date    Anxiety     Depression        FAMILY/SOCIAL HISTORY:  No family history on file. Social History     Socioeconomic History    Marital status: Single     Spouse name: Not on file    Number of children: Not on file    Years of education: Not on file    Highest education level: Not on file   Occupational History    Not on file   Tobacco Use    Smoking status: Not on file    Smokeless tobacco: Not on file   Substance and Sexual Activity    Alcohol use: Not on file    Drug use: Not on file    Sexual activity: Not on file   Other Topics Concern    Not on file   Social History Narrative    Not on file     Social Determinants of Health     Financial Resource Strain:     Difficulty of Paying Living Expenses: Not on file   Food Insecurity:     Worried About Running Out of Food in the Last Year: Not on file    Drew of Food in the Last Year: Not on file   Transportation Needs:     Lack of Transportation (Medical): Not on file    Lack of Transportation (Non-Medical):  Not on file   Physical Activity:     Days of Exercise per Week: Not on file    Minutes of Exercise per Session: Not on file   Stress:     Feeling of Stress : Not on file   Social Connections:     Frequency of Communication with Friends and Family: Not on file    Frequency of Social Gatherings with Friends and Family: Not on file    Attends Hinduism Services: Not on file    Active Member of [address] [place] [address] [employer] or Organizations: Not on file    Attends Club or Organization Meetings: Not on file    Marital Status: Not on file   Intimate Partner Violence:     Fear of Current or Ex-Partner: Not on file    Emotionally Abused: Not on file    Physically Abused: Not on file    Sexually Abused: Not on file   Housing Stability:     Unable to Pay for Housing in the Last Year: Not on file    Number of [place] in the Last Year: Not on file    Unstable Housing in the Last Year: Not on file SI/HI  Appetite:  [x] Normal  [] Increased  [] Decreased    Sleep:       [x] Normal  [] Fair       [] Poor            Energy:    [x] Normal  [] Increased  [] Decreased     SI [] Present  [x] Absent  HI  []Present  [x] Absent   Aggression:  [] yes  [] no  Patient is [x] able  [] unable to CONTRACT FOR SAFETY   Medication side effects(SE):  [x] None(Psych. Meds.) [] Other      Mental Status Examination on discharge:    Level of consciousness:  within normal limits   Appearance:  well-appearing  Behavior/Motor:  no abnormalities noted  Attitude toward examiner:  attentive and good eye contact  Speech:  spontaneous, normal rate and normal volume   Mood: anxious  Affect:  mood congruent  Thought processes:  coherent   Thought content:  Suicidal Ideation:  denies suicidal ideation  Cognition:  oriented to person, place, and time   Concentration intact  Memory intact  Insight good   Judgement fair   Fund of Knowledge adequate      ASSESSMENT:  Patient symptoms are:  [x] Well controlled  [x] Improving  [] Worsening  [] No change      Diagnosis:  Bipolar hypomanic episode    LABS:    No results for input(s): WBC, HGB, PLT in the last 72 hours. Recent Labs     07/02/22  0623      K 4.3      CO2 25   BUN 11   CREATININE 0.68   GLUCOSE 93     No results for input(s): BILITOT, ALKPHOS, AST, ALT in the last 72 hours. Lab Results   Component Value Date/Time    LABAMPH POSITIVE 06/30/2022 02:00 AM    BARBSCNU Neg 06/30/2022 02:00 AM    LABBENZ Neg 06/30/2022 02:00 AM    LABMETH Neg 06/30/2022 02:00 AM    OPIATESCREENURINE Neg 06/30/2022 02:00 AM    PHENCYCLIDINESCREENURINE Neg 06/30/2022 02:00 AM    ETOH <10 06/30/2022 02:00 AM     No results found for: TSH, FREET4  No results found for: LITHIUM  No results found for: VALPROATE, CBMZ    RISK ASSESSMENT AT DISCHARGE: Low risk for suicide and homicide. Treatment Plan:  Reviewed current Medications with the patient.  Education provided on the complaince with treatment. Risks, benefits, side effects, drug-to-drug interactions and alternatives to treatment were discussed. Encourage patient to attend outpatient follow up appointment and therapy. Patient was advised to call the outpatient provider, visit the nearest ED or call 911 if symptoms are not manageable. Patient's family member was contacted prior to the discharge.          Medication List      START taking these medications    divalproex 500 MG extended release tablet  Commonly known as: DEPAKOTE ER  Take 1 tablet by mouth daily  Start taking on: July 4, 2022        Vania First taking these medications    gabapentin 300 MG capsule  Commonly known as: NEURONTIN        STOP taking these medications    amphetamine-dextroamphetamine 30 MG tablet  Commonly known as: ADDERALL     FLUoxetine 20 MG capsule  Commonly known as: PROZAC     sertraline 100 MG tablet  Commonly known as: ZOLOFT           Where to Get Your Medications      These medications were sent to [address] [place] [address] [address] - [place] [id number] - F [phone]  [address] [place] [address]    Phone: [phone]   · divalproex 500 MG extended release tablet           Reason for more than one antipsychotic:   [x] N/A  [] 3 failed monotherapy(drugs tried):  [] Cross over to a new antipsychotic  [] Taper to monotherapy from polypharmacy  [] Augmentation of Clozapine therapy due to treatment resistance to single therapy        TIME SPEND - 35 MINUTES TO COMPLETE THE EVALUATION, DISCHARGE SUMMARY, MEDICATION RECONCILIATION AND FOLLOW UP CARE     Signed:  Radha Davila MD  7/3/2022  9:07 AM